# Patient Record
Sex: MALE | Race: WHITE | NOT HISPANIC OR LATINO | URBAN - METROPOLITAN AREA
[De-identification: names, ages, dates, MRNs, and addresses within clinical notes are randomized per-mention and may not be internally consistent; named-entity substitution may affect disease eponyms.]

---

## 2017-12-07 ENCOUNTER — ALLSCRIPTS OFFICE VISIT (OUTPATIENT)
Dept: OTHER | Facility: OTHER | Age: 77
End: 2017-12-07

## 2017-12-09 NOTE — CONSULTS
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 31)    Plan  Malignant melanoma of nose    · Follow-up PRN Evaluation and Treatment  Follow-up  Status: Complete  Done:[id number]   Ordered;Malignant melanoma of nose; Ordered By: Veto Goldman Performed:  Due: [id number]  4   Discussion/Summary    Mr Kalia Kent the is a 80-year-old male who was recently diagnosed with a melanoma on the left side of his nose  The patient reports that he had a lesion by his nose since spring of 2017  By October 2017 the lesion was enlarging thus he sought a medical opinion  The patient was found to have a melanoma on the left nose  On October 17, 2017 he had a wide excision  The pathology was consistent with malignant melanoma that was 6 mm, positive ulceration, with a mycotic rate of 22/mm2  The patient did have positive margins and had another excision performed on October 27, 2017  This pathology did show that all margins of the melanoma were negative  The patient did have a PET/CT scan on November 3, 2017 which showed slight uptake in the region of the recent excision consistent with his surgery  The patient was also found to have a mass with significant uptake in the left side of his neck lateral to the left submandibular gland most consistent with metastatic adenopathy  There were also two tiny lung nodules one in the right upper lobe and the other in the left angular region both of which are fairly small  The largest nodule is 6 mm in size  He also had an MRI of the brain which was unremarkable  Because of the left-sided neck mass the patient underwent a left neck dissection on November 27, 2017  The pathology from the surgery is still pending, however his son feels that at least one node was positive  this time the patient has at least a stage [id number] malignant melanoma  He is BRAF wild type, c-KIT negative, and had PD-L1 staining of 85%   At this time I do not have any evidence that the patient has metastatic disease as the lung nodules are too small to biopsy at this time  In this setting I would recommend adjuvant therapy with Nivolumab every two weeks for a one year time  Unfortunately, Pembrolizumab has not yet been approved in the adjuvant setting  Even if the lung nodules or metastatic disease the Nivolumab would also work for those lesions  Obviously close follow-up of these lesions is important in the future  I also discussed the BMS adjuvant clinical trial with the patient and his family today, however the patient has a long drive to the clinic  He would prefer to receive treatment locally if possible  The patient will come back to us on an as-needed basis  I will discuss the case with Dr Silverman we also discussed side effects of immunotherapy, prognosis, along with the pathophysiology of melanoma  The patient understands that he has a 50 to 60% chance of recurrence in the next five years  Chief Complaint  consultation regarding malignant melanoma of the nose      History of Present Illness  Dr Isra Reeves MD   Mr Michael Santoro the is a 15-year-old male who was recently diagnosed with a melanoma on the left side of his nose  The patient reports that he had a lesion by his nose since spring of 2017  By October 2017 the lesion was enlarging thus he sought a medical opinion  The patient was found to have a melanoma on the left nose  On October 17, 2017 he had a wide excision  The pathology was consistent with malignant melanoma that was 6 mm, positive ulceration, with a mycotic rate of 22/mm2  The patient did have positive margins and had another excision performed on October 27, 2017  This pathology did show that all margins of the melanoma were negative  The patient did have a PET/CT scan on November 3, 2017 which showed slight uptake in the region of the recent excision consistent with his surgery   The patient was also found to have a mass with significant uptake in the left side of his neck lateral to the left submandibular gland most consistent with metastatic adenopathy  There were also two tiny lung nodules one in the right upper lobe and the other in the left angular region both of which are fairly small  The largest nodule is 6 mm in size  He also had an MRI of the brain which was unremarkable  Because of the left-sided neck mass the patient underwent a left neck dissection on November 27, 2017  The pathology from the surgery is still pending, however his son feels that at least one node was positive  10/17/17 left sided nasal melanoma resection: 6 mm, ulcerated, mitotic rate of 22/mm2, positive marginre-excision of the melanoma: deep margin was negativelymph neck dissection: pathology pending   I have recommended the BMS adjuvant trial, he lives far away so otherwise I would recommend adjuvant immunotherapy   At least a stage IIIB: [id number], I am still awaiting the lymph node dissection results   Overall the patient is doing well  He has a stable energy level with an ECOG performance status of one  He has a good appetite and has not lost any weight  He has no other complaints at this time  He denies fevers, chills, CP, SOB, N/V, diarrhea, all other ROS are unremarkable  was reviewed  He does not have any history of melanoma  He does not have a history of blistering sunburns  BRAF: WTnegative      Review of Systems   Constitutional: No fever or chills, feels well, no tiredness, no recent weight gain or weight loss  Eyes: No complaints of eye pain, no red eyes, no discharge from eyes, no itchy eyes  ENT: no complaints of earache, no hearing loss, no nosebleeds, no nasal discharge, no sore throat, no hoarseness  Cardiovascular: No complaints of slow heart rate, no fast heart rate, no chest pain, no palpitations, no leg claudication, no lower extremity  Respiratory: No complaints of shortness of breath, no wheezing, no cough, no SOB on exertion, no orthopnea or PND    Gastrointestinal: No complaints of abdominal pain, no constipation, no nausea or vomiting, no diarrhea or bloody stools  Genitourinary: No complaints of dysuria, no incontinence, no hesitancy, no nocturia, no genital lesion, no testicular pain  Musculoskeletal: No complaints of arthralgia, no myalgias, no joint swelling or stiffness, no limb pain or swelling  Integumentary: No complaints of skin rash or skin lesions, no itching, no skin wound, no dry skin  Neurological: No compliants of headache, no confusion, no convulsions, no numbness or tingling, no dizziness or fainting, no limb weakness, no difficulty walking  Psychiatric: Is not suicidal, no sleep disturbances, no anxiety or depression, no change in personality, no emotional problems  Endocrine: No complaints of proptosis, no hot flashes, no muscle weakness, no erectile dysfunction, no deepening of the voice, no feelings of weakness  Hematologic/Lymphatic: No complaints of swollen glands, no swollen glands in the neck, does not bleed easily, no easy bruising  ROS reviewed  Past Medical History    The active problems and past medical history were reviewed and updated today  Surgical History    The surgical history was reviewed and updated today  Family History    The family history was reviewed and updated today  Social History  The social history was reviewed and updated today  The social history was reviewed and is unchanged  Vitals  Vital Signs    Recorded: [id number] 03:11PM   Temperature 96 3 F   Heart Rate 59   Respiration 16   Systolic 822   Diastolic 74   Height 5 ft 11 5 in   Weight 210 lb    BMI Calculated 28 88   BSA Calculated 2 16   O2 Saturation 98   Pain Scale 0       Physical Exam   Constitutional  General appearance: No acute distress, well appearing and well nourished  Eyes  Conjunctiva and lids: No swelling, erythema, or discharge  Pupils and irises: Equal, round and reactive to light     Ears, Nose, Mouth, and Throat  External inspection of ears and nose: Normal    Nasal mucosa, septum, and turbinates: Normal without edema or erythema  Oropharynx: Normal with no erythema, edema, exudate or lesions  Pulmonary  Respiratory effort: No increased work of breathing or signs of respiratory distress  Auscultation of lungs: Clear to auscultation, equal breath sounds bilaterally, no wheezes, no rales, no rhonci  Cardiovascular  Palpation of heart: Normal PMI, no thrills  Auscultation of heart: Normal rate and rhythm, normal S1 and S2, without murmurs  Examination of extremities for edema and/or varicosities: Normal    Abdomen  Abdomen: Non-tender, no masses  Liver and spleen: No hepatomegaly or splenomegaly  Lymphatic  Palpation of lymph nodes in neck: Abnormal  -- dissection scar healing  Musculoskeletal  Gait and station: Normal    Digits and nails: Normal without clubbing or cyanosis  Inspection/palpation of joints, bones, and muscles: Normal    Skin  Skin and subcutaneous tissue: Normal without rashes or lesions  Neurologic  Cranial nerves: Cranial nerves 2-12 intact  Sensation: No sensory loss  Psychiatric  Orientation to person, place and time: Normal    Mood and affect: Normal          Results/Data    Results  Pathology 10/17/17 left sided nasal melanoma resection: 6 mm, ulcerated, mitotic rate of 22/mm2, positive marginre-excision of the melanoma: deep margin was negativelymph neck dissection: pathology pending  PET 11/3/17 slight uptake in the recent excision of the left side of the nose, likely surgery related  Mass with significant uptake in the left side of the neck, lateral to the left submandibular gland as described in report  There are 2 tiny lung nodules, on in the right upper lobe, the other in the left lingular region  Signatures   Electronically signed by :  KENDRA Payne ; Dec  8 2017  7:39AM EST                       (Author)

## 2018-01-23 VITALS
TEMPERATURE: 96.3 F | HEIGHT: 72 IN | RESPIRATION RATE: 16 BRPM | SYSTOLIC BLOOD PRESSURE: 118 MMHG | DIASTOLIC BLOOD PRESSURE: 74 MMHG | HEART RATE: 59 BPM | WEIGHT: 210 LBS | BODY MASS INDEX: 28.44 KG/M2 | OXYGEN SATURATION: 98 %

## 2018-01-23 NOTE — CONSULTS
Chief Complaint  consultation regarding malignant melanoma of the nose      History of Present Illness  Dr Immanuel Salinas MD   Mr Kerr Lung the is a 75-year-old male who was recently diagnosed with a melanoma on the left side of his nose  The patient reports that he had a lesion by his nose since spring of 2017  By October 2017 the lesion was enlarging thus he sought a medical opinion  The patient was found to have a melanoma on the left nose  On October 17, 2017 he had a wide excision  The pathology was consistent with malignant melanoma that was 6 mm, positive ulceration, with a mycotic rate of 22/mm2  The patient did have positive margins and had another excision performed on October 27, 2017  This pathology did show that all margins of the melanoma were negative  The patient did have a PET/CT scan on November 3, 2017 which showed slight uptake in the region of the recent excision consistent with his surgery  The patient was also found to have a mass with significant uptake in the left side of his neck lateral to the left submandibular gland most consistent with metastatic adenopathy  There were also two tiny lung nodules one in the right upper lobe and the other in the left angular region both of which are fairly small  The largest nodule is 6 mm in size  He also had an MRI of the brain which was unremarkable  Because of the left-sided neck mass the patient underwent a left neck dissection on November 27, 2017  The pathology from the surgery is still pending, however his son feels that at least one node was positive       10/17/17 left sided nasal melanoma resection: 6 mm, ulcerated, mitotic rate of 22/mm2, positive margin  10/27/17 re-excision of the melanoma: deep margin was negative  11/21/17 lymph neck dissection: pathology pending   I have recommended the BMS adjuvant trial, he lives far away so otherwise I would recommend adjuvant immunotherapy   At least a stage IIIB: [id number], I am still awaiting the lymph node dissection results   Overall the patient is doing well  He has a stable energy level with an ECOG performance status of one  He has a good appetite and has not lost any weight  He has no other complaints at this time  He denies fevers, chills, CP, SOB, N/V, diarrhea, all other ROS are unremarkable  PFSH was reviewed  He does not have any history of melanoma  He does not have a history of blistering sunburns  BRAF: WT  c-Kit: negative      Review of Systems    Constitutional: No fever or chills, feels well, no tiredness, no recent weight gain or weight loss  Eyes: No complaints of eye pain, no red eyes, no discharge from eyes, no itchy eyes  ENT: no complaints of earache, no hearing loss, no nosebleeds, no nasal discharge, no sore throat, no hoarseness  Cardiovascular: No complaints of slow heart rate, no fast heart rate, no chest pain, no palpitations, no leg claudication, no lower extremity  Respiratory: No complaints of shortness of breath, no wheezing, no cough, no SOB on exertion, no orthopnea or PND  Gastrointestinal: No complaints of abdominal pain, no constipation, no nausea or vomiting, no diarrhea or bloody stools  Genitourinary: No complaints of dysuria, no incontinence, no hesitancy, no nocturia, no genital lesion, no testicular pain  Musculoskeletal: No complaints of arthralgia, no myalgias, no joint swelling or stiffness, no limb pain or swelling  Integumentary: No complaints of skin rash or skin lesions, no itching, no skin wound, no dry skin  Neurological: No compliants of headache, no confusion, no convulsions, no numbness or tingling, no dizziness or fainting, no limb weakness, no difficulty walking  Psychiatric: Is not suicidal, no sleep disturbances, no anxiety or depression, no change in personality, no emotional problems  Endocrine: No complaints of proptosis, no hot flashes, no muscle weakness, no erectile dysfunction, no deepening of the voice, no feelings of weakness  Hematologic/Lymphatic: No complaints of swollen glands, no swollen glands in the neck, does not bleed easily, no easy bruising  ROS reviewed  Past Medical History    The active problems and past medical history were reviewed and updated today  Surgical History    The surgical history was reviewed and updated today  Family History    The family history was reviewed and updated today  Social History  The social history was reviewed and updated today  The social history was reviewed and is unchanged  Vitals   Recorded: [id number] 03:11PM   Temperature 96 3 F   Heart Rate 59   Respiration 16   Systolic 857   Diastolic 74   Height 5 ft 11 5 in   Weight 210 lb    BMI Calculated 28 88   BSA Calculated 2 16   O2 Saturation 98   Pain Scale 0     Physical Exam    Constitutional   General appearance: No acute distress, well appearing and well nourished  Eyes   Conjunctiva and lids: No swelling, erythema, or discharge  Pupils and irises: Equal, round and reactive to light  Ears, Nose, Mouth, and Throat   External inspection of ears and nose: Normal     Nasal mucosa, septum, and turbinates: Normal without edema or erythema  Oropharynx: Normal with no erythema, edema, exudate or lesions  Pulmonary   Respiratory effort: No increased work of breathing or signs of respiratory distress  Auscultation of lungs: Clear to auscultation, equal breath sounds bilaterally, no wheezes, no rales, no rhonci  Cardiovascular   Palpation of heart: Normal PMI, no thrills  Auscultation of heart: Normal rate and rhythm, normal S1 and S2, without murmurs  Examination of extremities for edema and/or varicosities: Normal     Abdomen   Abdomen: Non-tender, no masses  Liver and spleen: No hepatomegaly or splenomegaly  Lymphatic   Palpation of lymph nodes in neck: Abnormal   dissection scar healing     Musculoskeletal   Gait and station: Normal     Digits and nails: Normal without clubbing or cyanosis  Inspection/palpation of joints, bones, and muscles: Normal     Skin   Skin and subcutaneous tissue: Normal without rashes or lesions  Neurologic   Cranial nerves: Cranial nerves 2-12 intact  Sensation: No sensory loss  Psychiatric   Orientation to person, place and time: Normal     Mood and affect: Normal          Results/Data    Results   Pathology 10/17/17 left sided nasal melanoma resection: 6 mm, ulcerated, mitotic rate of 22/mm2, positive margin  10/27/17 re-excision of the melanoma: deep margin was negative  11/21/17 lymph neck dissection: pathology pending  PET 11/3/17 slight uptake in the recent excision of the left side of the nose, likely surgery related  Mass with significant uptake in the left side of the neck, lateral to the left submandibular gland as described in report  There are 2 tiny lung nodules, on in the right upper lobe, the other in the left lingular region  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 31)    Plan  Malignant melanoma of nose    · Follow-up PRN Evaluation and Treatment  Follow-up  Status: Complete  Done:  [id number]   Ordered; For: Malignant melanoma of nose; Ordered By: Monika Casiano Performed:  Due: [id number]    4     Discussion/Summary    Mr Fadi Espinoza the is a 66-year-old male who was recently diagnosed with a melanoma on the left side of his nose  The patient reports that he had a lesion by his nose since spring of 2017  By October 2017 the lesion was enlarging thus he sought a medical opinion  The patient was found to have a melanoma on the left nose  On October 17, 2017 he had a wide excision  The pathology was consistent with malignant melanoma that was 6 mm, positive ulceration, with a mycotic rate of 22/mm2  The patient did have positive margins and had another excision performed on October 27, 2017  This pathology did show that all margins of the melanoma were negative   The patient did have a PET/CT scan on November 3, 2017 which showed slight uptake in the region of the recent excision consistent with his surgery  The patient was also found to have a mass with significant uptake in the left side of his neck lateral to the left submandibular gland most consistent with metastatic adenopathy  There were also two tiny lung nodules one in the right upper lobe and the other in the left angular region both of which are fairly small  The largest nodule is 6 mm in size  He also had an MRI of the brain which was unremarkable  Because of the left-sided neck mass the patient underwent a left neck dissection on November 27, 2017  The pathology from the surgery is still pending, however his son feels that at least one node was positive  At this time the patient has at least a stage [id number] malignant melanoma  He is BRAF wild type, c-KIT negative, and had PD-L1 staining of 85%  At this time I do not have any evidence that the patient has metastatic disease as the lung nodules are too small to biopsy at this time  In this setting I would recommend adjuvant therapy with Nivolumab every two weeks for a one year time  Unfortunately, Pembrolizumab has not yet been approved in the adjuvant setting  Even if the lung nodules or metastatic disease the Nivolumab would also work for those lesions  Obviously close follow-up of these lesions is important in the future  I also discussed the BMS adjuvant clinical trial with the patient and his family today, however the patient has a long drive to the clinic  He would prefer to receive treatment locally if possible  The patient will come back to us on an as-needed basis  I will discuss the case with Dr Silverman  Today we also discussed side effects of immunotherapy, prognosis, along with the pathophysiology of melanoma  The patient understands that he has a 50 to 60% chance of recurrence in the next five years  Signatures   Electronically signed by :  KENDRA Reeder ; Dec  8 2017  7:39AM EST (Author)